# Patient Record
Sex: FEMALE | Race: WHITE | URBAN - METROPOLITAN AREA
[De-identification: names, ages, dates, MRNs, and addresses within clinical notes are randomized per-mention and may not be internally consistent; named-entity substitution may affect disease eponyms.]

---

## 2017-09-26 ENCOUNTER — APPOINTMENT (OUTPATIENT)
Dept: URBAN - METROPOLITAN AREA CLINIC 292 | Age: 62
Setting detail: DERMATOLOGY
End: 2017-09-26

## 2017-09-26 DIAGNOSIS — L60.3 NAIL DYSTROPHY: ICD-10-CM

## 2017-09-26 DIAGNOSIS — L24 IRRITANT CONTACT DERMATITIS: ICD-10-CM

## 2017-09-26 DIAGNOSIS — B07.8 OTHER VIRAL WARTS: ICD-10-CM

## 2017-09-26 PROBLEM — L24.9 IRRITANT CONTACT DERMATITIS, UNSPECIFIED CAUSE: Status: ACTIVE | Noted: 2017-09-26

## 2017-09-26 PROCEDURE — 99203 OFFICE O/P NEW LOW 30 MIN: CPT | Mod: 25

## 2017-09-26 PROCEDURE — 17110 DESTRUCT B9 LESION 1-14: CPT

## 2017-09-26 PROCEDURE — OTHER CANTHARIDIN MULTI: OTHER

## 2017-09-26 PROCEDURE — OTHER COUNSELING: OTHER

## 2017-09-26 ASSESSMENT — LOCATION ZONE DERM: LOCATION ZONE: FINGER

## 2017-09-26 ASSESSMENT — LOCATION SIMPLE DESCRIPTION DERM: LOCATION SIMPLE: LEFT INDEX FINGER

## 2017-09-26 ASSESSMENT — LOCATION DETAILED DESCRIPTION DERM: LOCATION DETAILED: LEFT MID DORSAL INDEX FINGER

## 2017-09-26 NOTE — HPI: SKIN LESION
How Severe Is Your Skin Lesion?: moderate
Has Your Skin Lesion Been Treated?: not been treated
Is This A New Presentation, Or A Follow-Up?: Skin Lesion
Which Family Member (Optional)?: grandma

## 2017-09-26 NOTE — PROCEDURE: CANTHARIDIN MULTI
Include Z78.9 (Other Specified Conditions Influencing Health Status) As An Associated Diagnosis?: No
Strength: [facility] plus
Post-Care Instructions: 1. Remove the tape and wash off the medication thoroughly with soap and water _______ hours after the medication was applied.\\n2.  Within 24-48 hours after treatment, a fluid-filled or blood-filled blister may form.  If the blister becomes very tense, throbbing and painful, it may be opened with a sterile needle to relieve the pressure.  D not open the blister unless it is very painful and tense.  Do not remove the skin that makes the blister as this serves as a good covering for the wound.  Tylenol or Ibuprofen is recommended for pain.\\n3.  No later than 72 hours after initial treatment, you will need to debride the wart.  This is the most important step in treating your wart effectively.  The wart should be debrided everyday until it is resolved.
Total Number Of Lesions Treated: 5
Curette Text: Prior to application of cantharidin the lesions were lightly pared with a curette.
Medical Necessity Information: It is in your best interest to select a reason for this procedure from the list below. All of these items fulfill various CMS LCD requirements except the new and changing color options.
Medical Necessity Clause: This procedure was medically necessary because the lesions that were treated were:
Detail Level: Zone

## 2017-10-17 ENCOUNTER — APPOINTMENT (OUTPATIENT)
Dept: URBAN - METROPOLITAN AREA CLINIC 292 | Age: 62
Setting detail: DERMATOLOGY
End: 2017-10-17

## 2017-10-17 DIAGNOSIS — L60.3 NAIL DYSTROPHY: ICD-10-CM

## 2017-10-17 DIAGNOSIS — L24 IRRITANT CONTACT DERMATITIS: ICD-10-CM

## 2017-10-17 DIAGNOSIS — B07.8 OTHER VIRAL WARTS: ICD-10-CM

## 2017-10-17 PROBLEM — L24.9 IRRITANT CONTACT DERMATITIS, UNSPECIFIED CAUSE: Status: ACTIVE | Noted: 2017-10-17

## 2017-10-17 PROCEDURE — 17110 DESTRUCT B9 LESION 1-14: CPT

## 2017-10-17 PROCEDURE — 99213 OFFICE O/P EST LOW 20 MIN: CPT | Mod: 25

## 2017-10-17 PROCEDURE — OTHER CANTHARIDIN MULTI: OTHER

## 2017-10-17 PROCEDURE — OTHER COUNSELING: OTHER

## 2017-10-17 ASSESSMENT — LOCATION DETAILED DESCRIPTION DERM: LOCATION DETAILED: LEFT MID DORSAL INDEX FINGER

## 2017-10-17 ASSESSMENT — LOCATION ZONE DERM: LOCATION ZONE: FINGER

## 2017-10-17 ASSESSMENT — LOCATION SIMPLE DESCRIPTION DERM: LOCATION SIMPLE: LEFT INDEX FINGER

## 2017-10-17 NOTE — PROCEDURE: CANTHARIDIN MULTI
Post-Care Instructions: 1. Remove the tape and wash off the medication thoroughly with soap and water _______ hours after the medication was applied.\\n2.  Within 24-48 hours after treatment, a fluid-filled or blood-filled blister may form.  If the blister becomes very tense, throbbing and painful, it may be opened with a sterile needle to relieve the pressure.  D not open the blister unless it is very painful and tense.  Do not remove the skin that makes the blister as this serves as a good covering for the wound.  Tylenol or Ibuprofen is recommended for pain.\\n3.  No later than 72 hours after initial treatment, you will need to debride the wart.  This is the most important step in treating your wart effectively.  The wart should be debrided everyday until it is resolved.
Detail Level: Zone
Curette Before Application?: No
Medical Necessity Clause: This procedure was medically necessary because the lesions that were treated were:
Medical Necessity Information: It is in your best interest to select a reason for this procedure from the list below. All of these items fulfill various CMS LCD requirements except the new and changing color options.
Strength: [facility] plus
Total Number Of Lesions Treated: 5
Curette Text: Prior to application of cantharidin the lesions were lightly pared with a curette.